# Patient Record
Sex: FEMALE | URBAN - METROPOLITAN AREA
[De-identification: names, ages, dates, MRNs, and addresses within clinical notes are randomized per-mention and may not be internally consistent; named-entity substitution may affect disease eponyms.]

---

## 2024-08-07 ENCOUNTER — CLAIMS CREATED FROM THE CLAIM WINDOW (OUTPATIENT)
Dept: URBAN - METROPOLITAN AREA CLINIC 4 | Facility: CLINIC | Age: 59
End: 2024-08-07
Payer: COMMERCIAL

## 2024-08-07 ENCOUNTER — CLAIMS CREATED FROM THE CLAIM WINDOW (OUTPATIENT)
Dept: URBAN - METROPOLITAN AREA SURGERY CENTER 31 | Facility: SURGERY CENTER | Age: 59
End: 2024-08-07
Payer: COMMERCIAL

## 2024-08-07 ENCOUNTER — LAB OUTSIDE AN ENCOUNTER (OUTPATIENT)
Dept: URBAN - METROPOLITAN AREA CLINIC 19 | Facility: CLINIC | Age: 59
End: 2024-08-07

## 2024-08-07 ENCOUNTER — TELEPHONE ENCOUNTER (OUTPATIENT)
Dept: URBAN - METROPOLITAN AREA CLINIC 19 | Facility: CLINIC | Age: 59
End: 2024-08-07

## 2024-08-07 DIAGNOSIS — D12.3 BENIGN NEOPLASM OF TRANSVERSE COLON: ICD-10-CM

## 2024-08-07 DIAGNOSIS — D12.3 ADENOMA OF TRANSVERSE COLON: ICD-10-CM

## 2024-08-07 DIAGNOSIS — Z12.11 COLON CANCER SCREENING: ICD-10-CM

## 2024-08-07 DIAGNOSIS — D12.0 BENIGN NEOPLASM OF CECUM: ICD-10-CM

## 2024-08-07 DIAGNOSIS — Z80.0 BROTHER AT YOUNG AGE FAMILY HISTORY OF COLON CANCER: ICD-10-CM

## 2024-08-07 DIAGNOSIS — D12.0 ADENOMA OF CECUM: ICD-10-CM

## 2024-08-07 DIAGNOSIS — K63.89 OTHER SPECIFIED DISEASES OF INTESTINE: ICD-10-CM

## 2024-08-07 DIAGNOSIS — Z80.0 FAMILY HISTORY OF COLON CANCER: ICD-10-CM

## 2024-08-07 DIAGNOSIS — D12.0 ADENOMATOUS POLYP OF CECUM: ICD-10-CM

## 2024-08-07 DIAGNOSIS — D12.3 ADENOMATOUS POLYP OF TRANSVERSE COLON: ICD-10-CM

## 2024-08-07 PROCEDURE — 00812 ANES LWR INTST SCR COLSC: CPT | Performed by: NURSE ANESTHETIST, CERTIFIED REGISTERED

## 2024-08-07 PROCEDURE — 88305 TISSUE EXAM BY PATHOLOGIST: CPT | Performed by: PATHOLOGY

## 2024-08-07 PROCEDURE — 45381 COLONOSCOPY SUBMUCOUS NJX: CPT | Performed by: INTERNAL MEDICINE

## 2024-08-07 PROCEDURE — 45385 COLONOSCOPY W/LESION REMOVAL: CPT | Performed by: INTERNAL MEDICINE

## 2024-10-01 ENCOUNTER — TELEPHONE ENCOUNTER (OUTPATIENT)
Dept: URBAN - METROPOLITAN AREA CLINIC 19 | Facility: CLINIC | Age: 59
End: 2024-10-01

## 2024-11-06 ENCOUNTER — OFFICE VISIT (OUTPATIENT)
Dept: URBAN - METROPOLITAN AREA SURGERY CENTER 31 | Facility: SURGERY CENTER | Age: 59
End: 2024-11-06

## 2025-01-28 ENCOUNTER — WEB ENCOUNTER (OUTPATIENT)
Dept: URBAN - METROPOLITAN AREA CLINIC 19 | Facility: CLINIC | Age: 60
End: 2025-01-28

## 2025-01-28 RX ORDER — LINACLOTIDE 145 UG/1
1 CAPSULE AT LEAST 30 MINUTES BEFORE THE FIRST MEAL OF THE DAY ON AN EMPTY STOMACH CAPSULE, GELATIN COATED ORAL ONCE A DAY
Qty: 90 | Refills: 1 | OUTPATIENT
Start: 2025-01-28 | End: 2025-07-27

## 2025-05-05 ENCOUNTER — DASHBOARD ENCOUNTERS (OUTPATIENT)
Age: 60
End: 2025-05-05

## 2025-05-05 ENCOUNTER — LAB OUTSIDE AN ENCOUNTER (OUTPATIENT)
Dept: URBAN - METROPOLITAN AREA CLINIC 19 | Facility: CLINIC | Age: 60
End: 2025-05-05

## 2025-05-05 ENCOUNTER — OFFICE VISIT (OUTPATIENT)
Dept: URBAN - METROPOLITAN AREA CLINIC 19 | Facility: CLINIC | Age: 60
End: 2025-05-05
Payer: COMMERCIAL

## 2025-05-05 DIAGNOSIS — Z86.0100 PERSONAL HISTORY OF COLONIC POLYPS: ICD-10-CM

## 2025-05-05 DIAGNOSIS — K59.04 CHRONIC IDIOPATHIC CONSTIPATION: ICD-10-CM

## 2025-05-05 DIAGNOSIS — Z80.0 FAMILY HISTORY OF COLON CANCER: ICD-10-CM

## 2025-05-05 PROBLEM — 82934008: Status: ACTIVE | Noted: 2025-05-05

## 2025-05-05 PROCEDURE — 99214 OFFICE O/P EST MOD 30 MIN: CPT | Performed by: INTERNAL MEDICINE

## 2025-05-05 RX ORDER — LINACLOTIDE 145 UG/1
1 CAPSULE AT LEAST 30 MINUTES BEFORE THE FIRST MEAL OF THE DAY ON AN EMPTY STOMACH CAPSULE, GELATIN COATED ORAL ONCE A DAY
Qty: 90 | Refills: 1 | Status: ACTIVE | COMMUNITY
Start: 2025-01-28 | End: 2025-07-27

## 2025-05-05 RX ORDER — LINACLOTIDE 145 UG/1
1 CAPSULE AT LEAST 30 MINUTES BEFORE THE FIRST MEAL OF THE DAY ON AN EMPTY STOMACH CAPSULE, GELATIN COATED ORAL ONCE A DAY
Qty: 90 | Refills: 3 | OUTPATIENT
Start: 2025-05-05 | End: 2026-04-30

## 2025-05-05 RX ORDER — SODIUM SULFATE, POTASSIUM SULFATE, MAGNESIUM SULFATE 1.6; 3.13; 17.5 G/177ML; G/177ML; G/177ML
12 TABLETS THE FIRST DOSE THE EVENING BEFORE AND SECOND DOSE THE MORNING OF COLONOSCOPY SOLUTION ORAL TWICE A DAY
Qty: 177 | OUTPATIENT
Start: 2025-05-05 | End: 2025-05-06

## 2025-05-05 NOTE — PHYSICAL EXAM RECTAL:
normal tone, no external hemorrhoids, no masses palpable, no red blood, Tenderness on EVA, Internal hemorrhoids present
No

## 2025-05-05 NOTE — HPI-TODAY'S VISIT:
Mrs. Morse is a 60 year old female who presents to GI clinic for family history of colon cancer.   On 8/7/2024 colonoscopy were performed during which a 14 mm cecal polyp was removed after saline lift with piecemeal polypectomy, 4mm transverse colon polyp was removed with cold snare and melanosis coli. Pathology of the polyps showed tubular adenomas and melanosis coli.  She had two brothers with colon cancer.   She is taking linzess 145 mcg and she reports it is working well for her.

## 2025-05-05 NOTE — PHYSICAL EXAM HENT:
Pt arrived ambulatory to clinic for IV Venofer.  IV was started without difficulties using aseptic technique with excellent blood return.  Administered IV iron per MD order.  Pt tolerated infusion well, no s/s of infusion reaction.  Pt verbalized understanding and appreciative of information.   normocephalic, atraumatic, Face within normal limits, Oral cavity appearance normal

## 2025-05-16 ENCOUNTER — OFFICE VISIT (OUTPATIENT)
Dept: URBAN - METROPOLITAN AREA CLINIC 128 | Facility: CLINIC | Age: 60
End: 2025-05-16

## 2025-05-29 ENCOUNTER — OFFICE VISIT (OUTPATIENT)
Dept: URBAN - METROPOLITAN AREA LAB 2 | Facility: LAB | Age: 60
End: 2025-05-29

## 2025-05-29 RX ORDER — LINACLOTIDE 145 UG/1
1 CAPSULE AT LEAST 30 MINUTES BEFORE THE FIRST MEAL OF THE DAY ON AN EMPTY STOMACH CAPSULE, GELATIN COATED ORAL ONCE A DAY
Qty: 90 | Refills: 1 | Status: ACTIVE | COMMUNITY
Start: 2025-01-28 | End: 2025-07-27

## 2025-05-29 RX ORDER — LINACLOTIDE 145 UG/1
1 CAPSULE AT LEAST 30 MINUTES BEFORE THE FIRST MEAL OF THE DAY ON AN EMPTY STOMACH CAPSULE, GELATIN COATED ORAL ONCE A DAY
Qty: 90 | Refills: 3 | Status: ACTIVE | COMMUNITY
Start: 2025-05-05 | End: 2026-04-30

## 2025-06-03 ENCOUNTER — TELEPHONE ENCOUNTER (OUTPATIENT)
Dept: URBAN - METROPOLITAN AREA CLINIC 19 | Facility: CLINIC | Age: 60
End: 2025-06-03

## 2025-08-12 ENCOUNTER — TELEPHONE ENCOUNTER (OUTPATIENT)
Dept: URBAN - METROPOLITAN AREA CLINIC 19 | Facility: CLINIC | Age: 60
End: 2025-08-12

## 2025-08-12 RX ORDER — LINACLOTIDE 145 UG/1
1 CAPSULE AT LEAST 30 MINUTES BEFORE THE FIRST MEAL OF THE DAY ON AN EMPTY STOMACH CAPSULE, GELATIN COATED ORAL ONCE A DAY
Qty: 90 | Refills: 3
Start: 2025-05-05 | End: 2026-08-07